# Patient Record
Sex: MALE | Race: NATIVE HAWAIIAN OR OTHER PACIFIC ISLANDER | URBAN - METROPOLITAN AREA
[De-identification: names, ages, dates, MRNs, and addresses within clinical notes are randomized per-mention and may not be internally consistent; named-entity substitution may affect disease eponyms.]

---

## 2017-08-14 ENCOUNTER — MEDICAL CORRESPONDENCE (OUTPATIENT)
Dept: HEALTH INFORMATION MANAGEMENT | Facility: CLINIC | Age: 62
End: 2017-08-14

## 2017-08-15 ENCOUNTER — TELEPHONE (OUTPATIENT)
Dept: GASTROENTEROLOGY | Facility: CLINIC | Age: 62
End: 2017-08-15

## 2017-09-22 ENCOUNTER — HOSPITAL ENCOUNTER (OUTPATIENT)
Facility: CLINIC | Age: 62
Discharge: HOME | End: 2017-09-22
Attending: INTERNAL MEDICINE | Admitting: INTERNAL MEDICINE
Payer: COMMERCIAL

## 2017-09-22 ENCOUNTER — SURGERY (OUTPATIENT)
Age: 62
End: 2017-09-22

## 2017-09-22 VITALS — BODY MASS INDEX: 21.99 KG/M2 | HEIGHT: 65 IN | WEIGHT: 132 LBS

## 2017-09-22 PROCEDURE — 91065 BREATH HYDROGEN/METHANE TEST: CPT | Performed by: INTERNAL MEDICINE

## 2017-09-22 RX ORDER — RABEPRAZOLE SODIUM 20 MG/1
20 TABLET, DELAYED RELEASE ORAL DAILY
COMMUNITY

## 2017-09-22 RX ORDER — MULTIPLE VITAMINS W/ MINERALS TAB 9MG-400MCG
1 TAB ORAL DAILY
COMMUNITY

## 2017-09-22 NOTE — IP AVS SNAPSHOT
"                  MRN:[id number]                      After Visit Summary   9/22/2017    Sunil Pathak    MRN: [id number]           Thank you!     Thank you for choosing [facility] for your care. Our goal is always to provide you with excellent care. Hearing back from our patients is one way we can continue to improve our services. Please take a few minutes to complete the written survey that you may receive in the mail after you visit with us. Thank you!        Patient Information     Date Of Birth          1955        About your hospital stay     You were admitted on:  September 22, 2017 You last received care in the:  [facility], Endoscopy    You were discharged on:  September 22, 2017       Who to Call     For medical emergencies, please call 911.  For non-urgent questions about your medical care, please call your primary care provider or clinic, [phone]  For questions related to your surgery, please call your surgery clinic        Attending Provider     Provider Specialty    Alessandra Lazaro MD Internal Medicine       Primary Care Provider Office Phone # Fax #    Abhay Ye [phone]      Further instructions from your care team       Hydrogen Breath Test Discharge Instructions    1. You may experience diarrhea for the next 12-24 hours.  2. Resume a regular diet.  3. Follow-up with your referring doctor in clinic.  4. If you have questions call [phone] from 7:00am-4:30pm     Xin Mancia RN    Pending Results     No orders found from 9/20/2017 to 9/23/2017.            Admission Information     Date & Time Provider Department Dept. Phone    9/22/2017 Alessandra Lazaro MD [facility], Endoscopy [phone]      Your Vitals Were     Height Weight BMI (Body Mass Index)             1.651 m (5' 5\") 59.9 kg (132 lb) 21.97 kg/m2         MyChart Information     [facility] lets you send messages to your doctor, view your test results, renew your " "prescriptions, schedule appointments and more. To sign up, go to [email or web address] . Click on \"Log in\" on the left side of the screen, which will take you to the Welcome page. Then click on \"Sign up Now\" on the right side of the page.     You will be asked to enter the access code listed below, as well as some personal information. Please follow the directions to create your username and password.     Your access code is: [email or web address]  Expires: 2017 10:25 AM     Your access code will  in 90 days. If you need help or a new code, please call your [facility] clinic or [phone].        Care EveryWhere ID     This is your Care EveryWhere ID. This could be used by other organizations to access your [facility] medical records  [id number]        Equal Access to Services     ROSIBEL CRUMP : Emil Hood, shawn colunga, bianca hartman, geno platt . So [facility] [phone].    ATENCIÓN: Si habla español, tiene a koch disposición servicios gratuitos de asistencia lingüística. Llame al [phone].    We comply with applicable federal civil rights laws and [place] laws. We do not discriminate on the basis of race, color, national origin, age, disability sex, sexual orientation or gender identity.               Review of your medicines      UNREVIEWED medicines. Ask your doctor about these medicines        Dose / Directions    ALDACTONE PO        Refills:  0       CARVEDILOL PO        Refills:  0       LACTAID PO        Refills:  0       LISINOPRIL PO        Refills:  0       Multi-vitamin Tabs tablet        Dose:  1 tablet   Take 1 tablet by mouth daily   Refills:  0       RABEprazole 20 MG EC tablet   Commonly known as:  ACIPHEX        Dose:  20 mg   Take 20 mg by mouth daily   Refills:  0       SIMVASTATIN PO        Refills:  0       XARELTO PO        Refills:  0       ZANTAC PO        Refills:  0                Protect others around you: Learn how to " safely use, store and throw away your medicines at www.disposemymeds.org.             Medication List: This is a list of all your medications and when to take them. Check marks below indicate your daily home schedule. Keep this list as a reference.      Medications           Morning Afternoon Evening Bedtime As Needed    ALDACTONE PO                                CARVEDILOL PO                                LACTAID PO                                LISINOPRIL PO                                Multi-vitamin Tabs tablet   Take 1 tablet by mouth daily                                RABEprazole 20 MG EC tablet   Commonly known as:  ACIPHEX   Take 20 mg by mouth daily                                SIMVASTATIN PO                                XARELTO PO                                ZANTAC PO

## 2017-09-22 NOTE — DISCHARGE INSTRUCTIONS
Hydrogen Breath Test Discharge Instructions    1. You may experience diarrhea for the next 12-24 hours.  2. Resume a regular diet.  3. Follow-up with your referring doctor in clinic.  4. If you have questions call [phone] from 7:00am-4:30pm     Xin Mancia RN

## 2017-09-22 NOTE — IP AVS SNAPSHOT
[facility], [place], Endoscopy    [address] [address]    Phone:  [phone]                                       After Visit Summary   9/22/2017    Sunil Pathak    MRN: [id number]           After Visit Summary Signature Page     I have received my discharge instructions, and my questions have been answered. I have discussed any challenges I see with this plan with the nurse or doctor.    ..........................................................................................................................................  Patient/Patient Representative Signature      ..........................................................................................................................................  Patient Representative Print Name and Relationship to Patient    ..................................................               ................................................  Date                                            Time    ..........................................................................................................................................  Reviewed by Signature/Title    ...................................................              ..............................................  Date                                                            Time

## 2017-09-22 NOTE — OR NURSING
Pt here for HBT. Procedure explained and consent given. Baseline breath obtained prior to pt drinking 25 gms dxylose. Pt tolerated test well without complaints. Hydrogen levels from 2 ppm at baseline, 3 at 60 min, 6 at 120 min, 2 at 180 min. Methane levels 0 ppm throughout test . DC instructions given. Pt will follow up with referring provider for test results.

## 2020-03-11 ENCOUNTER — HEALTH MAINTENANCE LETTER (OUTPATIENT)
Age: 65
End: 2020-03-11

## 2020-12-27 ENCOUNTER — HEALTH MAINTENANCE LETTER (OUTPATIENT)
Age: 65
End: 2020-12-27

## 2021-04-25 ENCOUNTER — HEALTH MAINTENANCE LETTER (OUTPATIENT)
Age: 66
End: 2021-04-25

## 2021-10-09 ENCOUNTER — HEALTH MAINTENANCE LETTER (OUTPATIENT)
Age: 66
End: 2021-10-09

## 2022-05-21 ENCOUNTER — HEALTH MAINTENANCE LETTER (OUTPATIENT)
Age: 67
End: 2022-05-21

## 2022-09-17 ENCOUNTER — HEALTH MAINTENANCE LETTER (OUTPATIENT)
Age: 67
End: 2022-09-17

## 2023-06-04 ENCOUNTER — HEALTH MAINTENANCE LETTER (OUTPATIENT)
Age: 68
End: 2023-06-04